# Patient Record
Sex: FEMALE | Race: WHITE | Employment: STUDENT | URBAN - METROPOLITAN AREA
[De-identification: names, ages, dates, MRNs, and addresses within clinical notes are randomized per-mention and may not be internally consistent; named-entity substitution may affect disease eponyms.]

---

## 2017-07-07 ENCOUNTER — HOSPITAL ENCOUNTER (EMERGENCY)
Facility: CLINIC | Age: 22
Discharge: HOME | End: 2017-07-07
Attending: PHYSICIAN ASSISTANT | Admitting: PHYSICIAN ASSISTANT
Payer: COMMERCIAL

## 2017-07-07 ENCOUNTER — APPOINTMENT (OUTPATIENT)
Dept: CT IMAGING | Facility: CLINIC | Age: 22
End: 2017-07-07
Attending: PHYSICIAN ASSISTANT
Payer: COMMERCIAL

## 2017-07-07 VITALS
DIASTOLIC BLOOD PRESSURE: 79 MMHG | TEMPERATURE: 99.5 F | SYSTOLIC BLOOD PRESSURE: 105 MMHG | HEART RATE: 77 BPM | OXYGEN SATURATION: 98 % | RESPIRATION RATE: 16 BRPM
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
## 2017-07-07 DIAGNOSIS — S80.12XA TRAUMATIC ECCHYMOSIS OF LEFT LOWER LEG, INITIAL ENCOUNTER: ICD-10-CM

## 2017-07-07 DIAGNOSIS — T07.XXXA ABRASIONS OF MULTIPLE SITES: ICD-10-CM
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
## 2017-07-07 DIAGNOSIS — R10.84 ABDOMINAL PAIN, GENERALIZED: ICD-10-CM

## 2017-07-07 LAB
ALBUMIN UR-MCNC: NEGATIVE MG/DL
APPEARANCE UR: ABNORMAL
BILIRUB UR QL STRIP: NEGATIVE
COLOR UR AUTO: YELLOW
GLUCOSE UR STRIP-MCNC: NEGATIVE MG/DL
HCG UR QL: NEGATIVE
HGB UR QL STRIP: NEGATIVE
KETONES UR STRIP-MCNC: 5 MG/DL
LEUKOCYTE ESTERASE UR QL STRIP: ABNORMAL
MUCOUS THREADS #/AREA URNS LPF: PRESENT /LPF
NITRATE UR QL: NEGATIVE
PH UR STRIP: 5 PH (ref 5–7)
RBC #/AREA URNS AUTO: 1 /HPF (ref 0–2)
SP GR UR STRIP: 1.02 (ref 1–1.03)
SQUAMOUS #/AREA URNS AUTO: 5 /HPF (ref 0–1)
URN SPEC COLLECT METH UR: ABNORMAL
UROBILINOGEN UR STRIP-MCNC: 0 MG/DL (ref 0–2)
WBC #/AREA URNS AUTO: 1 /HPF (ref 0–2)

## 2017-07-07 PROCEDURE — 81001 URINALYSIS AUTO W/SCOPE: CPT | Performed by: PHYSICIAN ASSISTANT

## 2017-07-07 PROCEDURE — 99284 EMERGENCY DEPT VISIT MOD MDM: CPT | Mod: 25

## 2017-07-07 PROCEDURE — 74176 CT ABD & PELVIS W/O CONTRAST: CPT

## 2017-07-07 PROCEDURE — 81025 URINE PREGNANCY TEST: CPT | Performed by: PHYSICIAN ASSISTANT

## 2017-07-07 ASSESSMENT — ENCOUNTER SYMPTOMS
ABDOMINAL PAIN: 1
BACK PAIN: 0
WOUND: 1
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
## 2017-07-07 NOTE — ED AVS SNAPSHOT
[facility] Emergency Department    [address]    [facility] [address]    Phone:  [phone]    Fax:  [phone]                                       Bridgette Reyna   MRN: [id number]    Department:  [facility] Emergency Department   Date of Visit:  7/7/2017           After Visit Summary Signature Page     I have received my discharge instructions, and my questions have been answered. I have discussed any challenges I see with this plan with the nurse or doctor.    ..........................................................................................................................................  Patient/Patient Representative Signature      ..........................................................................................................................................  Patient Representative Print Name and Relationship to Patient    ..................................................               ................................................  Date                                            Time    ..........................................................................................................................................  Reviewed by Signature/Title    ...................................................              ..............................................  Date                                                            Time
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
## 2017-10-13 ENCOUNTER — ALLIED HEALTH/NURSE VISIT (OUTPATIENT)
Dept: NURSING | Facility: CLINIC | Age: 22
End: 2017-10-13
Payer: COMMERCIAL

## 2017-10-13 DIAGNOSIS — Z23 NEED FOR PROPHYLACTIC VACCINATION AND INOCULATION AGAINST INFLUENZA: Primary | ICD-10-CM

## 2017-10-13 PROCEDURE — 90471 IMMUNIZATION ADMIN: CPT

## 2017-10-13 PROCEDURE — 99207 ZZC NO CHARGE NURSE ONLY: CPT

## 2017-10-13 PROCEDURE — 90686 IIV4 VACC NO PRSV 0.5 ML IM: CPT

## 2017-10-13 NOTE — PROGRESS NOTES
Injectable Influenza Immunization Documentation    1.  Is the person to be vaccinated sick today?   No    2. Does the person to be vaccinated have an allergy to a component   of the vaccine?   No    3. Has the person to be vaccinated ever had a serious reaction   to influenza vaccine in the past?   No    4. Has the person to be vaccinated ever had Guillain-Barré syndrome?   No    Form completed by Ingrid Brennan MA// October 13, 2017 10:27 AM

## 2017-10-13 NOTE — MR AVS SNAPSHOT
"              After Visit Summary   10/13/2017    Bridgette Reyna    MRN: [id number]           Patient Information     Date Of Birth          1995        Visit Information        Provider Department      10/13/2017 10:30 AM [facility]        Today's Diagnoses     Need for prophylactic vaccination and inoculation against influenza    -  1       Follow-ups after your visit        Your next 10 appointments already scheduled     Oct 13, 2017 10:30 AM CDT   Nurse Only with [facility]SE [facility]   [facility])    [address]  [address]  [place] [address] [phone]              Who to contact     If you have questions or need follow up information about today's clinic visit or your schedule please contact [facility] directly at [phone].  Normal or non-critical lab and imaging results will be communicated to you by MyChart, letter or phone within 4 business days after the clinic has received the results. If you do not hear from us within 7 days, please contact the clinic through MyChart or phone. If you have a critical or abnormal lab result, we will notify you by phone as soon as possible.  Submit refill requests through [employer] or call your pharmacy and they will forward the refill request to us. Please allow 3 business days for your refill to be completed.          Additional Information About Your Visit        [employer] Information     [employer] lets you send messages to your doctor, view your test results, renew your prescriptions, schedule appointments and more. To sign up, go to [email or web address] . Click on \"Log in\" on the left side of the screen, which will take you to the Welcome page. Then click on \"Sign up Now\" on the right side of the page.     You will be asked to enter the access code listed below, as well as some personal information. Please follow the directions to create your username and password.   "   Your access code is: [email or web address]  Expires: 2018 10:29 AM     Your access code will  in 90 days. If you need help or a new code, please call your [place] clinic or [phone].        Care EveryWhere ID     This is your Care EveryWhere ID. This could be used by other organizations to access your [facility] [place] medical records  [id number]         Blood Pressure from Last 3 Encounters:   17 105/79    Weight from Last 3 Encounters:   99 33 lb (15 kg) (29 %)*   99 35 lb (15.9 kg) (49 %)*   99 33 lb (15 kg) (39 %)*     * Growth percentiles are based on [facility] 2-20 Years data.              We Performed the Following     FLU VAC, SPLIT VIRUS IM > 3 YO (QUADRIVALENT) [66310]     Vaccine Administration, Initial [80500]        Primary Care Provider    None Specified       No primary provider on file.        Equal Access to Services     AROLDO ROSAS : Hadii zac Jiménez, waaxda luenma, qaybta kaalmada kendell, jessica nelson . So [facility] [phone].    ATENCIÓN: Si habla español, tiene a gallo disposición servicios gratuitos de asistencia lingüística. Llame al [phone].    We comply with applicable federal civil rights laws and [place] laws. We do not discriminate on the basis of race, color, national origin, age, disability, sex, sexual orientation, or gender identity.            Thank you!     Thank you for choosing [facility]  for your care. Our goal is always to provide you with excellent care. Hearing back from our patients is one way we can continue to improve our services. Please take a few minutes to complete the written survey that you may receive in the mail after your visit with us. Thank you!             Your Updated Medication List - Protect others around you: Learn how to safely use, store and throw away your medicines at www.disposemymeds.org.      Notice  As of 10/13/2017 10:29 AM    You have not been prescribed any  medications.

## 2018-03-05 ENCOUNTER — APPOINTMENT (OUTPATIENT)
Dept: ULTRASOUND IMAGING | Facility: CLINIC | Age: 23
End: 2018-03-05
Attending: EMERGENCY MEDICINE
Payer: COMMERCIAL

## 2018-03-05 ENCOUNTER — HOSPITAL ENCOUNTER (EMERGENCY)
Facility: CLINIC | Age: 23
Discharge: HOME | End: 2018-03-05
Attending: EMERGENCY MEDICINE | Admitting: EMERGENCY MEDICINE
Payer: COMMERCIAL

## 2018-03-05 VITALS
HEIGHT: 66 IN | BODY MASS INDEX: 20.89 KG/M2 | OXYGEN SATURATION: 99 % | RESPIRATION RATE: 20 BRPM | SYSTOLIC BLOOD PRESSURE: 128 MMHG | TEMPERATURE: 98.1 F | DIASTOLIC BLOOD PRESSURE: 78 MMHG | WEIGHT: 130 LBS

## 2018-03-05 DIAGNOSIS — R10.2 PELVIC PAIN IN FEMALE: ICD-10-CM

## 2018-03-05 DIAGNOSIS — N73.0 PID (ACUTE PELVIC INFLAMMATORY DISEASE): ICD-10-CM

## 2018-03-05 LAB
ALBUMIN UR-MCNC: NEGATIVE MG/DL
APPEARANCE UR: CLEAR
BACTERIA #/AREA URNS HPF: ABNORMAL /HPF
BILIRUB UR QL STRIP: NEGATIVE
COLOR UR AUTO: ABNORMAL
GLUCOSE UR STRIP-MCNC: NEGATIVE MG/DL
HCG UR QL: NEGATIVE
HGB UR QL STRIP: ABNORMAL
KETONES UR STRIP-MCNC: NEGATIVE MG/DL
LEUKOCYTE ESTERASE UR QL STRIP: NEGATIVE
MUCOUS THREADS #/AREA URNS LPF: PRESENT /LPF
NITRATE UR QL: NEGATIVE
PH UR STRIP: 5 PH (ref 5–7)
RBC #/AREA URNS AUTO: <1 /HPF (ref 0–2)
SOURCE: ABNORMAL
SP GR UR STRIP: 1 (ref 1–1.03)
SPECIMEN SOURCE: NORMAL
SQUAMOUS #/AREA URNS AUTO: 1 /HPF (ref 0–1)
UROBILINOGEN UR STRIP-MCNC: 0 MG/DL (ref 0–2)
WBC #/AREA URNS AUTO: <1 /HPF (ref 0–5)
WET PREP SPEC: NORMAL

## 2018-03-05 PROCEDURE — 87210 SMEAR WET MOUNT SALINE/INK: CPT | Performed by: EMERGENCY MEDICINE

## 2018-03-05 PROCEDURE — 87491 CHLMYD TRACH DNA AMP PROBE: CPT | Performed by: EMERGENCY MEDICINE

## 2018-03-05 PROCEDURE — 25000132 ZZH RX MED GY IP 250 OP 250 PS 637: Performed by: EMERGENCY MEDICINE

## 2018-03-05 PROCEDURE — 96372 THER/PROPH/DIAG INJ SC/IM: CPT

## 2018-03-05 PROCEDURE — 25000128 H RX IP 250 OP 636: Performed by: EMERGENCY MEDICINE

## 2018-03-05 PROCEDURE — 81001 URINALYSIS AUTO W/SCOPE: CPT | Performed by: EMERGENCY MEDICINE

## 2018-03-05 PROCEDURE — 99285 EMERGENCY DEPT VISIT HI MDM: CPT | Mod: 25

## 2018-03-05 PROCEDURE — 87591 N.GONORRHOEAE DNA AMP PROB: CPT | Performed by: EMERGENCY MEDICINE

## 2018-03-05 PROCEDURE — 81025 URINE PREGNANCY TEST: CPT | Performed by: EMERGENCY MEDICINE

## 2018-03-05 PROCEDURE — 93976 VASCULAR STUDY: CPT

## 2018-03-05 RX ORDER — CEFTRIAXONE SODIUM 250 MG
250 VIAL (EA) INJECTION ONCE
Status: COMPLETED | OUTPATIENT
Start: 2018-03-05 | End: 2018-03-05

## 2018-03-05 RX ORDER — METRONIDAZOLE 500 MG/1
500 TABLET ORAL 2 TIMES DAILY
Qty: 28 TABLET | Refills: 0 | Status: SHIPPED | OUTPATIENT
Start: 2018-03-05 | End: 2018-03-19

## 2018-03-05 RX ORDER — IBUPROFEN 600 MG/1
600 TABLET, FILM COATED ORAL ONCE
Status: COMPLETED | OUTPATIENT
Start: 2018-03-05 | End: 2018-03-05

## 2018-03-05 RX ADMIN — IBUPROFEN 600 MG: 600 TABLET ORAL at 15:46

## 2018-03-05 RX ADMIN — CEFTRIAXONE SODIUM 250 MG: 250 INJECTION, POWDER, FOR SOLUTION INTRAMUSCULAR; INTRAVENOUS at 17:21

## 2018-03-05 NOTE — ED AVS SNAPSHOT
[facility] Emergency Department    [address] [place] [address]    Phone:  [phone]    Fax:  [phone]                                       Bridgette Reyna   MRN: [id number]    Department:  [facility] Emergency Department   Date of Visit:  3/5/2018           Patient Information     Date Of Birth          1995        Your diagnoses for this visit were:     Pelvic pain in female     PID (acute pelvic inflammatory disease)        You were seen by Kirby Foster MD.      Follow-up Information     Follow up with Terri Giron MD In 2 days.    Specialty:  OB/Gyn    Why:  or other gynecologist in 2 days    Contact information:    [address] [place] [address] [address] [place] [address]  [phone]          Discharge Instructions       Please see your primary care doctor or gynecologist in one day for a recheck. You can always return to the emergency room for a recheck too if you cannot get an appointment.    Return to the Emergency Room if you develop fevers more than 102, worsening pain  or if you have any new concerns about your health.        It was my pleasure to take care of you today. Thanks for visiting [facility]   Emergency Room.     Kirby Foster MD        Discharge Instructions    Pelvic Inflammatory Disease    Pelvic Inflammatory Disease (PID) is a serious form of infection affecting women.  PID is commonly caused by chlamydia or gonorrhea but can be caused by normal bacteria in the vaginal area getting up higher into your uterus or fallopian tubes.  It develops when the infection spreads from a woman s vagina throughout the genital tract to the uterus and fallopian tubes.  Symptoms of PID can include pain in the lower belly that can be severe during sex or even with walking, fever, chills, vaginal discharge, bleeding or spotting.  PID is diagnosed by pelvic exam.    PID can cause serious problems such as difficulty getting pregnant due  "to scarring of the fallopian tubes and ectopic pregnancy, a pregnancy that develops in the fallopian tubes.  If you have had PID, it is important for your gynecologist to know.    Return to the Emergency Department right away if:    You get an oral temperature above 101oF or as directed by your doctor.    You have new symptoms or anything that worries you.    What can I do to help myself?    Take any medication prescribed by your doctor It is very important to take all the pills in your prescription, even if you feel better before you finish them. If you don t take all the pills, the infection could come back.    You may use Tylenol  (acetaminophen) or Motrin , Advil  (ibuprofen) for pain. Be sure to read and follow the package directions, and ask your doctor if you have questions.    Narcotic pain pills. If you have been given a narcotic such as Vicodin  (hydrocodone with acetaminophen), Percocet  (oxycodone with acetaminophen), or Tylenol  with codeine, do not drive for four hours after you have taken it. If the narcotic contains Tylenol  (acetaminophen), do not take Tylenol  with it. All narcotics will cause constipation, so eat a high fiber diet.      Make certain that all your recent sex partners (anyone you had sex with in the 2 months before your symptoms started) are tested and treated. If your sex partners are not treated, they can infect you again.  Probiotics: If you have been given an antibiotic, you may want to also take a probiotic pill or eat yogurt with live cultures. Probiotics have \"good bacteria\" to help your intestines stay healthy. Studies have shown that probiotics help prevent diarrhea and other intestine problems (including C. diff infection) when you take antibiotics. You can buy these without a prescription in the pharmacy section of the store.     Your symptoms today could be related to a sexually transmitted infection (STI).  Common symptoms of STIs include burning with urination, " discharge from penis, vaginal itching, vaginal discharge, and pelvic pain.      Depending on your symptoms, the physical examination performed by your provider and test results today, your provider may have prescribed antibiotics today or may have decided to wait for test results.     The best ways to avoid sexually transmitted infections are:    Using a condom every time you have sex.    Avoiding sex when you or your partner has any symptoms that could be caused by an infection (such as itching, discharge, or pain with urination).    If you are diagnosed with an infection, make sure your sexual partners are treated too.      If you were given a prescription for medicine here today, be sure to read all of the information (including the package insert) that comes with your prescription.  This will include important information about the medicine, its side effects, and any warnings that you need to know about.  The pharmacist who fills the prescription can provide more information and answer questions you may have about the medicine.  If you have questions or concerns that the pharmacist cannot address, please call or return to the Emergency Department.       24 Hour Appointment Hotline       To make an appointment at any [facility], call [phone] ([phone]). If you don't have a family doctor or clinic, we will help you find one. [facility] are conveniently located to serve the needs of you and your family.             Review of your medicines      START taking        Dose / Directions Last dose taken    doxycycline 100 MG ED starter pack   Commonly known as:  VIBRA-TAB   Dose:  1 tablet   Quantity:  28 tablet        Take 1 tablet by mouth 2 times daily for 14 days   Refills:  0        metroNIDAZOLE 500 MG tablet   Commonly known as:  FLAGYL   Dose:  500 mg   Quantity:  28 tablet        Take 1 tablet (500 mg) by mouth 2 times daily for 14 days   Refills:  0                Prescriptions were sent  or printed at these locations (2 Prescriptions)                   Other Prescriptions                Printed at Department/Unit printer (2 of 2)         doxycycline (VIBRA-TAB) 100 MG ED starter pack               metroNIDAZOLE (FLAGYL) 500 MG tablet                Procedures and tests performed during your visit     Chlamydia trachomatis PCR    HCG qualitative urine    Neisseria gonorrhoea PCR    UA with Microscopic    US Pelvic Complete w Transvaginal & Abd/Pel Duplex Limited    Wet prep      Orders Needing Specimen Collection     None      Pending Results     Date and Time Order Name Status Description    3/5/2018 1537 US Pelvic Complete w Transvaginal & Abd/Pel Duplex Limited Preliminary     3/5/2018 1537 Neisseria gonorrhoea PCR In process     3/5/2018 1537 Chlamydia trachomatis PCR In process             Pending Culture Results     Date and Time Order Name Status Description    3/5/2018 1537 Neisseria gonorrhoea PCR In process     3/5/2018 1537 Chlamydia trachomatis PCR In process             Pending Results Instructions     If you had any lab results that were not finalized at the time of your Discharge, you can call the ED Lab Result RN at [phone]. You will be contacted by this team for any positive Lab results or changes in treatment. The nurses are available 7 days a week from 10A to 6:30P.  You can leave a message 24 hours per day and they will return your call.        Test Results From Your Hospital Stay        3/5/2018  4:48 PM      Component Results     Component    Specimen Description    Vagina    Wet Prep    Moderate  PMNs seen      Wet Prep    No Trichomonas seen    Wet Prep    No clue cells seen    Wet Prep    No yeast seen         3/5/2018  4:27 PM         3/5/2018  4:27 PM               3/5/2018  5:00 PM      Component Results     Component Value Ref Range & Units Status    Color Urine Straw  Final    Appearance Urine Clear  Final    Glucose Urine Negative NEG^Negative mg/dL Final     Bilirubin Urine Negative NEG^Negative Final    Ketones Urine Negative NEG^Negative mg/dL Final    Specific Gravity Urine 1.004 1.003 - 1.035 Final    Blood Urine Small (A) NEG^Negative Final    pH Urine 5.0 5.0 - 7.0 pH Final    Protein Albumin Urine Negative NEG^Negative mg/dL Final    Urobilinogen mg/dL 0.0 0.0 - 2.0 mg/dL Final    Nitrite Urine Negative NEG^Negative Final    Leukocyte Esterase Urine Negative NEG^Negative Final    Source Midstream Urine  Final    WBC Urine <1 0 - 5 /HPF Final    RBC Urine <1 0 - 2 /HPF Final    Bacteria Urine Few (A) NEG^Negative /HPF Final    Squamous Epithelial /HPF Urine 1 0 - 1 /HPF Final    Mucous Urine Present (A) NEG^Negative /LPF Final         3/5/2018  4:59 PM      Component Results     Component Value Ref Range & Units Status    HCG Qual Urine Negative NEG^Negative Final    This test is for screening purposes.  Results should be interpreted along with   the clinical picture.  Confirmation testing is available if warranted by   ordering [id number], HCG Quantitative Pregnancy.           3/5/2018  4:34 PM      Narrative     US PELVIS COMPLETE WITH TRANSVAGINAL AND DOPPLER LIMITED    3/5/2018  4:19 PM     HISTORY: Left pelvic pain.    TECHNIQUE: Transvaginal images were performed to better evaluate the  patient's uterus, ovaries and endometrial stripe. Spectral Doppler and  wave form analysis was also performed to evaluate blood flow to the  ovaries.    COMPARISON: CT of the abdomen and pelvis without IV contrast performed  7/7/2017.    FINDINGS: The uterus is measured at 9.7 x 5.1 x 5 cm. No fibroids are  evident. Endometrial stripe measures 0.7 cm and is within normal  limits for a premenopausal patient. The right ovary is unremarkable.  The left ovary is unremarkable. No solid adnexal masses are  identified. Trace amount of anechoic free pelvic fluid is present.  Spectral Doppler and waveform analysis demonstrate arterial and venous  flow to both ovaries.        Impression      IMPRESSION:   1. Trace free fluid in the pelvis is nonspecific, and may be  physiologic.  2. Otherwise unremarkable pelvic ultrasound with Doppler. No  convincing sonographic evidence for ovarian torsion.                Clinical Quality Measure: Blood Pressure Screening     Your blood pressure was checked while you were in the emergency department today. The last reading we obtained was  BP: 140/59 . Please read the guidelines below about what these numbers mean and what you should do about them.  If your systolic blood pressure (the top number) is less than 120 and your diastolic blood pressure (the bottom number) is less than 80, then your blood pressure is normal. There is nothing more that you need to do about it.  If your systolic blood pressure (the top number) is 120-139 or your diastolic blood pressure (the bottom number) is 80-89, your blood pressure may be higher than it should be. You should have your blood pressure rechecked within a year by a primary care provider.  If your systolic blood pressure (the top number) is 140 or greater or your diastolic blood pressure (the bottom number) is 90 or greater, you may have high blood pressure. High blood pressure is treatable, but if left untreated over time it can put you at risk for heart attack, stroke, or kidney failure. You should have your blood pressure rechecked by a primary care provider within the next 4 weeks.  If your provider in the emergency department today gave you specific instructions to follow-up with your doctor or provider even sooner than that, you should follow that instruction and not wait for up to 4 weeks for your follow-up visit.        Thank you for choosing [facility]       Thank you for choosing [facility] for your care. Our goal is always to provide you with excellent care. Hearing back from our patients is one way we can continue to improve our services. Please take a few minutes to complete the written survey that you may receive  "in the mail after you visit with us. Thank you!        [employer] Information     [employer] lets you send messages to your doctor, view your test results, renew your prescriptions, schedule appointments and more. To sign up, go to [email or web address] . Click on \"Log in\" on the left side of the screen, which will take you to the Welcome page. Then click on \"Sign up Now\" on the right side of the page.     You will be asked to enter the access code listed below, as well as some personal information. Please follow the directions to create your username and password.     Your access code is: [email or web address]  Expires: 6/3/2018  5:13 PM     Your access code will  in 90 days. If you need help or a new code, please call your [facility] clinic or [phone].        Care EveryWhere ID     This is your Care EveryWhere ID. This could be used by other organizations to access your [facility] medical records  [id number]        Equal Access to Services     AROLDO ROSAS : Hadii aad ku hadasho Sodeanne, waaxda luqadaha, qaybta kaalmada adeegyaroel, jessica nelson . So [facility] [phone].    ATENCIÓN: Si habla español, tiene a gallo disposición servicios gratuitos de asistencia lingüística. Llame al [phone].    We comply with applicable federal civil rights laws and [place] laws. We do not discriminate on the basis of race, color, national origin, age, disability, sex, sexual orientation, or gender identity.            After Visit Summary       This is your record. Keep this with you and show to your community pharmacist(s) and doctor(s) at your next visit.                  "

## 2018-03-05 NOTE — ED NOTES
Patient presents with left-sided pelvic pain for 1 week. Patient denies being sexually active, no vaginal bleeding. Patient is having green discharge and painful urination. ABCDs intact, alert and oriented x 4.

## 2018-03-05 NOTE — ED PROVIDER NOTES
"[facility] Emergency Medicine Note:    History     Chief Complaint:  Pelvic Pain    HPI: Bridgette Reyna is a 22 year old female who presents for evaluation of pelvic pain. She notes left-sided pelvic pain over the last week.  She denies any recent sexual activity but she does have a new vaginal discharge that is greenish.  She is a history of bacterial vaginosis.  She denies possibility of pregnancy.  Last sexual activity was 6 months ago.  She has no dysuria or increased frequency of urination. There is no hematuria.  The left-sided pelvic pain comes and goes but is sharp and stabbing at times. She has no history of ovarian cyst or torsion.  No nausea vomiting diarrhea blood in her stools or fevers.  Does have malaise today    Allergies:  No Known Drug Allergies     Medications:    The patient is not currently taking any prescribed medications.    Past Medical History:    The patient denies any relevant past medical history.    Past Surgical History:    History reviewed. No pertinent past surgical history.    Family History:    Breast cancer    Social History:  The patient was accompanied to the ED: alone.    Smoking Status: Never  Smokeless Tobacco: Never  Alcohol Use: No     Review of Systems  See HPI, a 10 point review of systems was performed and is otherwise negative except as noted in HPI.     Physical Exam   Vital signs:  Patient Vitals for the past 24 hrs:   BP Temp Temp src Heart Rate Resp SpO2 Height Weight   03/05/18 1700 128/78 - - 82 - - 1.676 m (5' 6\") 59 kg (130 lb)   03/05/18 1516 140/59 98.1  F (36.7  C) Oral 90 20 99 % - -       Physical Exam  General: Patient is alert and interactive when I enter the room  Head:  The scalp, face, and head appear normal  Eyes:  The pupils are equal, round, and reactive to light    Conjunctivae and sclerae are normal  ENT:    External acoustic canals are normal    The oropharynx is normal without erythema.     Uvula is in the midline  Neck:  Normal range of " motion  CV:  Regular rate. S1/S2. No murmurs.   Resp:  Lungs are clear without wheezes or rales. No distress  GI:  Abdomen is soft, no rigidity, guarding, or rebound    No distension. LLQ tenderness to palpation.    MS:  Normal tone. Joints grossly normal without effusions.     No asymmetric leg swelling, calf or thigh tenderness.      Normal motor assessment of all extremities.  Skin:  No rash or lesions noted. Normal capillary refill noted  Neuro: Speech is normal and fluent. Face is symmetric.     Moving all extremities well.   Psych:  Awake. Alert.  Normal affect.  Appropriate interactions.  Lymph: No anterior cervical lymphadenopathy noted  :   Pelvic Exam: Chaperoned by JENNY Bennett  Normal external genitalia without lesions.  Speculum exam:  No blood in vaginal canal.  Cervical os is closed.  Red cervix around os, no gross pus  Vaginal Discharge is seen and is white, thick, but not consistent with yeast.  No tissue seen in vaginal canal.  Bimanual exam:  No masses appreciated.  positive cervical motion tenderness.  positive Pain to palpation of adnexal structures on the left not on right  no Pain to palpation of uterus/bladder:    Repeat exam: Mild left lower quadrant tenderness, improved from earlier.     Emergency Department Course   Imaging:  US Pelvic complete w Transvaginal and Abdomen Pelvis Duplex limited:   US Pelvic Complete w Transvaginal & Abd/Pel Duplex Limited   Preliminary Result   IMPRESSION:    1. Trace free fluid in the pelvis is nonspecific, and may be   physiologic.   2. Otherwise unremarkable pelvic ultrasound with Doppler. No   convincing sonographic evidence for ovarian torsion.          Laboratory:  Labs Ordered and Resulted from Time of ED Arrival Up to the Time of Departure from the ED   ROUTINE UA WITH MICROSCOPIC - Abnormal; Notable for the following:        Result Value    Blood Urine Small (*)     Bacteria Urine Few (*)     Mucous Urine Present (*)     All other components within  normal limits   HCG QUALITATIVE URINE   PREP FOR PROCEDURE   WET PREP   CHLAMYDIA TRACHOMATIS PCR   NEISSERIA GONORRHOEAE PCR     Interventions:  1546: Advil 600mg PO   1721: Rocephin 250mg Intramuscular     Emergency Department Course:  1540: I performed an exam of the patient as documented above.   The patient was sent for a US Pelvic complete w Transvaginal and Abdomen Pelvis Duplex limited while in the emergency department, results above.   The patient provided a urine sample here in the emergency department. This was sent for laboratory testing, findings above.  IV was inserted and blood was drawn for laboratory testing, results above.  1710: Patient rechecked and updated.    1711: Findings and plan explained to the Patient. Patient discharged home with instructions regarding supportive care, medications, and reasons to return. The importance of close follow-up was reviewed. The patient was prescribed Vibra-tab and Flagyl.    See above for meds/radiology/procedures intervention    Impression & Plan    CMS Diagnoses: none   Medical Decision Making:  Bridgette Reyna is a 22 year old female who presents with pelvic pain.  They look overall well.  A broad differential diagnosis was considered including colitis, appendicitis, intestinal cramping, pyelonephritis, UTI, kidney stone, constipation, diverticulitis, volvulus, ileus, obstruction, pregnancy (ectopic or intrauterine), ovarian cyst (enlarged or ruptured), ovarian torsion, PID, etc as possibilities.  The workup in the ED shows no definitive etiology for pain; given symptoms would treat for PID. Cultures were sent.  Wet prep shows no BV, no trich.  No signs of TOA on ultrasound.  No other etiology for the patients pain is found at this point and my suspicion of an intraabdominal catastrophe or other worrisome etiology is very low.  I will not therefore admit for serial exams and further workup.  Patient is hemodynamically stable in ED.  Return for fevers  greater than 102, increasing pain, other new symptoms develop.  Abdominal pain handout given.  Questions were answered.     Critical Care time:  none    Diagnosis:    ICD-10-CM    1. Pelvic pain in female R10.2    2. PID (acute pelvic inflammatory disease) N73.0        Disposition:  discharged to home    Discharge Medications:  Discharge Medication List as of 3/5/2018  5:14 PM      START taking these medications    Details   doxycycline (VIBRA-TAB) 100 MG ED starter pack Take 1 tablet by mouth 2 times daily for 14 days, Disp-28 tablet, R-0, Local Print      metroNIDAZOLE (FLAGYL) 500 MG tablet Take 1 tablet (500 mg) by mouth 2 times daily for 14 days, Disp-28 tablet, R-0, Local Print             Scribe Disclosure:  I, Luara Manning, am serving as a scribe at 3:42 PM on 3/5/2018 to document services personally performed by Kirby Foster MD based on my observations and the provider's statements to me.    4/1/2017   [facility] EMERGENCY DEPARTMENT         Kirby Foster MD  03/05/18 1949

## 2018-03-05 NOTE — ED AVS SNAPSHOT
[facility] Emergency Department    [address]    [facility] [address]    Phone:  [phone]    Fax:  [phone]                                       Bridgette Reyna   MRN: [id number]    Department:  [facility] Emergency Department   Date of Visit:  3/5/2018           After Visit Summary Signature Page     I have received my discharge instructions, and my questions have been answered. I have discussed any challenges I see with this plan with the nurse or doctor.    ..........................................................................................................................................  Patient/Patient Representative Signature      ..........................................................................................................................................  Patient Representative Print Name and Relationship to Patient    ..................................................               ................................................  Date                                            Time    ..........................................................................................................................................  Reviewed by Signature/Title    ...................................................              ..............................................  Date                                                            Time

## 2018-03-06 LAB
C TRACH DNA SPEC QL NAA+PROBE: NEGATIVE
N GONORRHOEA DNA SPEC QL NAA+PROBE: NEGATIVE
SPECIMEN SOURCE: NORMAL
SPECIMEN SOURCE: NORMAL

## 2018-07-14 ENCOUNTER — OFFICE VISIT (OUTPATIENT)
Dept: URGENT CARE | Facility: URGENT CARE | Age: 23
End: 2018-07-14
Payer: COMMERCIAL

## 2018-07-14 VITALS
TEMPERATURE: 98.3 F | DIASTOLIC BLOOD PRESSURE: 60 MMHG | SYSTOLIC BLOOD PRESSURE: 100 MMHG | WEIGHT: 132 LBS | OXYGEN SATURATION: 99 % | HEART RATE: 71 BPM | BODY MASS INDEX: 21.31 KG/M2

## 2018-07-14 DIAGNOSIS — R10.2 PELVIC PAIN IN FEMALE: Primary | ICD-10-CM

## 2018-07-14 LAB
ALBUMIN UR-MCNC: NEGATIVE MG/DL
APPEARANCE UR: CLEAR
BACTERIA #/AREA URNS HPF: ABNORMAL /HPF
BETA HCG QUAL IFA URINE: NEGATIVE
BILIRUB UR QL STRIP: NEGATIVE
COLOR UR AUTO: YELLOW
GLUCOSE UR STRIP-MCNC: NEGATIVE MG/DL
HGB UR QL STRIP: NEGATIVE
KETONES UR STRIP-MCNC: NEGATIVE MG/DL
LEUKOCYTE ESTERASE UR QL STRIP: ABNORMAL
MUCOUS THREADS #/AREA URNS LPF: PRESENT /LPF
NITRATE UR QL: NEGATIVE
NON-SQ EPI CELLS #/AREA URNS LPF: ABNORMAL /LPF
PH UR STRIP: 5 PH (ref 5–7)
RBC #/AREA URNS AUTO: ABNORMAL /HPF
SOURCE: ABNORMAL
SP GR UR STRIP: 1.02 (ref 1–1.03)
SPECIMEN SOURCE: NORMAL
UROBILINOGEN UR STRIP-ACNC: 0.2 EU/DL (ref 0.2–1)
WBC #/AREA URNS AUTO: ABNORMAL /HPF
WET PREP SPEC: NORMAL

## 2018-07-14 PROCEDURE — 87591 N.GONORRHOEAE DNA AMP PROB: CPT | Performed by: PHYSICIAN ASSISTANT

## 2018-07-14 PROCEDURE — 84703 CHORIONIC GONADOTROPIN ASSAY: CPT | Performed by: PHYSICIAN ASSISTANT

## 2018-07-14 PROCEDURE — 81001 URINALYSIS AUTO W/SCOPE: CPT | Performed by: PHYSICIAN ASSISTANT

## 2018-07-14 PROCEDURE — 99213 OFFICE O/P EST LOW 20 MIN: CPT | Performed by: PHYSICIAN ASSISTANT

## 2018-07-14 PROCEDURE — 87210 SMEAR WET MOUNT SALINE/INK: CPT | Performed by: PHYSICIAN ASSISTANT

## 2018-07-14 PROCEDURE — 87491 CHLMYD TRACH DNA AMP PROBE: CPT | Performed by: PHYSICIAN ASSISTANT

## 2018-07-14 RX ORDER — AZITHROMYCIN 500 MG/1
1000 TABLET, FILM COATED ORAL ONCE
Qty: 2 TABLET | Refills: 0 | Status: SHIPPED | OUTPATIENT
Start: 2018-07-14 | End: 2018-07-14

## 2018-07-14 NOTE — MR AVS SNAPSHOT
"              After Visit Summary   2018    Bridgette Reyna    MRN: [id number]           Patient Information     Date Of Birth          1995        Visit Information        Provider Department      2018 2:40 PM Cass Santiago PA-C [facility]        Today's Diagnoses     Pelvic pain in female    -  1       Follow-ups after your visit        Who to contact     If you have questions or need follow up information about today's clinic visit or your schedule please contact [facility] directly at [phone].  Normal or non-critical lab and imaging results will be communicated to you by Quizrrhart, letter or phone within 4 business days after the clinic has received the results. If you do not hear from us within 7 days, please contact the clinic through [facility] or phone. If you have a critical or abnormal lab result, we will notify you by phone as soon as possible.  Submit refill requests through kinkon or call your pharmacy and they will forward the refill request to us. Please allow 3 business days for your refill to be completed.          Additional Information About Your Visit        MyChart Information     kinkon lets you send messages to your doctor, view your test results, renew your prescriptions, schedule appointments and more. To sign up, go to [email or web address] . Click on \"Log in\" on the left side of the screen, which will take you to the Welcome page. Then click on \"Sign up Now\" on the right side of the page.     You will be asked to enter the access code listed below, as well as some personal information. Please follow the directions to create your username and password.     Your access code is: [email or web address]  Expires: 10/16/2018  7:19 PM     Your access code will  in 90 days. If you need help or a new code, please call your [place] clinic or [phone].        Care EveryWhere ID     This is your Care EveryWhere ID. This could be used by " other organizations to access your [place] medical records  [id number]        Your Vitals Were     Pulse Temperature Last Period Pulse Oximetry BMI (Body Mass Index)       71 98.3  F (36.8  C) (Rectal) 07/06/2018 99% 21.31 kg/m2        Blood Pressure from Last 3 Encounters:   07/14/18 100/60   03/05/18 128/78   07/07/17 105/79    Weight from Last 3 Encounters:   07/14/18 132 lb (59.9 kg)   03/05/18 130 lb (59 kg)   12/27/99 33 lb (15 kg) (29 %)*     * Growth percentiles are based on [facility] 2-20 Years data.              We Performed the Following     *UA reflex to Microscopic and Culture ([place] and [facility] (except [place] and [place])     Beta HCG qual IFA urine     Chlamydia trachomatis PCR     Neisseria gonorrhoeae PCR     Urine Microscopic     Wet prep          Today's Medication Changes          These changes are accurate as of 7/14/18 11:59 PM.  If you have any questions, ask your nurse or doctor.               Start taking these medicines.        Dose/Directions    azithromycin 500 MG tablet   Commonly known as:  ZITHROMAX   Used for:  Pelvic pain in female   Started by:  Cass Santiago PA-C        Dose:  1000 mg   Take 2 tablets (1,000 mg) by mouth once for 1 dose   Quantity:  2 tablet   Refills:  0            Where to get your medicines      These medications were sent to [id number] - [place], [place] - [address]  AT [address] & [address] [place] [address]     Phone:  [phone]     azithromycin 500 MG tablet                Primary Care Provider Fax #    Physician No Ref-Primary [phone]       No address on file        Equal Access to Services     MILAN ROSAS AH: Hadii zac Jiménez, waaxda luqadaha, qaybta kaalmada adeconnor, jessica degroot. So [facility] [phone].    ATENCIÓN: Si habla español, tiene a gallo disposición servicios gratuitos de asistencia lingüística. Llame al [phone].    We comply  with applicable federal civil rights laws and [place] laws. We do not discriminate on the basis of race, color, national origin, age, disability, sex, sexual orientation, or gender identity.            Thank you!     Thank you for choosing [facility]  for your care. Our goal is always to provide you with excellent care. Hearing back from our patients is one way we can continue to improve our services. Please take a few minutes to complete the written survey that you may receive in the mail after your visit with us. Thank you!             Your Updated Medication List - Protect others around you: Learn how to safely use, store and throw away your medicines at www.disposemymeds.org.          This list is accurate as of 7/14/18 11:59 PM.  Always use your most recent med list.                   Brand Name Dispense Instructions for use Diagnosis    azithromycin 500 MG tablet    ZITHROMAX    2 tablet    Take 2 tablets (1,000 mg) by mouth once for 1 dose    Pelvic pain in female

## 2018-07-14 NOTE — PROGRESS NOTES
SUBJECTIVE:   Bridgette Reyna is a 22 year old female who  presents today for a possible UTI. Symptoms of dysuria, burning and suprapubic pain and pressure have been going on [id number]).  Hematuria no.  gradual onset and still presentand mild.  There is no history of fever, chills, nausea or vomiting.  Does have some vaginal discharge. This patient does have a history of urinary tract infections. Patient denies long duration, rigors, flank pain, temperature > 101 degrees F., Vomiting, significant nausea or diarrhea, taking Coumadin and GFR less than 30 within the last year.  Hx of PID 4 moths ago.  Wants STD check primarily   No past medical history on file.  No current outpatient prescriptions on file.     Social History   Substance Use Topics     Smoking status: Never Smoker     Smokeless tobacco: Never Used     Alcohol use No       ROS:   Review of systems negative except as stated above.    OBJECTIVE:  /60 (BP Location: Right arm)  Pulse 71  Temp 98.3  F (36.8  C) (Rectal)  Wt 132 lb (59.9 kg)  LMP 07/06/2018  SpO2 99%  BMI 21.31 kg/m2  GENERAL APPEARANCE: healthy, alert and no distress  RESP: lungs clear to auscultation - no rales, rhonchi or wheezes  CV: regular rates and rhythm, normal S1 S2, no murmur noted  ABDOMEN:  soft, nontender, no HSM or masses and bowel sounds normal  BACK: No CVA tenderness  GU_female: refusing at this time as just want STD testing.  Negative pelvic ultrasound 4 months ago.    SKIN: no suspicious lesions or rashes    Results for orders placed or performed in visit on 07/14/18   *UA reflex to Microscopic and Culture ([place] and [facility] (except [place] and [place])   Result Value Ref Range    Color Urine Yellow     Appearance Urine Clear     Glucose Urine Negative NEG^Negative mg/dL    Bilirubin Urine Negative NEG^Negative    Ketones Urine Negative NEG^Negative mg/dL    Specific Gravity Urine 1.025 1.003 - 1.035    Blood Urine Negative NEG^Negative    pH  Urine 5.0 5.0 - 7.0 pH    Protein Albumin Urine Negative NEG^Negative mg/dL    Urobilinogen Urine 0.2 0.2 - 1.0 EU/dL    Nitrite Urine Negative NEG^Negative    Leukocyte Esterase Urine Trace (A) NEG^Negative    Source Midstream Urine    Urine Microscopic   Result Value Ref Range    WBC Urine 0 - 5 OTO5^0 - 5 /HPF    RBC Urine O - 2 OTO2^O - 2 /HPF    Squamous Epithelial /LPF Urine Few FEW^Few /LPF    Bacteria Urine Few (A) NEG^Negative /HPF    Mucous Urine Present (A) NEG^Negative /LPF   Beta HCG qual IFA urine   Result Value Ref Range    Beta HCG Qual IFA Urine Negative NEG^Negative      Wet prep   Result Value Ref Range    Specimen Description Vagina     Wet Prep No Trichomonas seen     Wet Prep No clue cells seen     Wet Prep No yeast seen    Chlamydia trachomatis PCR   Result Value Ref Range    Specimen Description Vagina     Chlamydia Trachomatis PCR Negative NEG^Negative   Neisseria gonorrhoeae PCR   Result Value Ref Range    Specimen Descrip Vagina     N Gonorrhea PCR Negative NEG^Negative         assessment/plan:  (R10.2) Pelvic pain in female  (primary encounter diagnosis)  Comment:   Plan: Wet prep, *UA reflex to Microscopic and Culture        ([place] and [facility] (except [place]        and [place]), Chlamydia trachomatis PCR,         Neisseria gonorrhoeae PCR, Urine Microscopic,         Beta HCG qual IFA urine, azithromycin         (ZITHROMAX) 500 MG tablet, CANCELED: HCG Qual,         Urine - [facility],  [facility]  ([id number])          Negative testing up to now.  Will treat with Chlamydia.  Labs pending.  FU with GYN if sx worsen or new sx develop
